# Patient Record
Sex: FEMALE | Race: WHITE | URBAN - NONMETROPOLITAN AREA
[De-identification: names, ages, dates, MRNs, and addresses within clinical notes are randomized per-mention and may not be internally consistent; named-entity substitution may affect disease eponyms.]

---

## 2018-07-03 ENCOUNTER — TRANSFERRED RECORDS (OUTPATIENT)
Dept: HEALTH INFORMATION MANAGEMENT | Facility: OTHER | Age: 63
End: 2018-07-03

## 2018-07-04 ENCOUNTER — RESULTS ONLY (OUTPATIENT)
Dept: LAB | Age: 63
End: 2018-07-04

## 2018-07-04 ENCOUNTER — MEDICAL CORRESPONDENCE (OUTPATIENT)
Dept: HEALTH INFORMATION MANAGEMENT | Facility: OTHER | Age: 63
End: 2018-07-04

## 2018-07-04 LAB
HGB BLD-MCNC: 8.8 G/DL (ref 11.7–15.7)
INR PPP: 2.33 (ref 0–1.3)
PROTHROMBIN TIME: 28 S (ref 11.9–15.2)

## 2018-07-05 ENCOUNTER — TRANSFERRED RECORDS (OUTPATIENT)
Dept: HEALTH INFORMATION MANAGEMENT | Facility: OTHER | Age: 63
End: 2018-07-05

## 2018-07-06 ENCOUNTER — MEDICAL CORRESPONDENCE (OUTPATIENT)
Dept: HEALTH INFORMATION MANAGEMENT | Facility: OTHER | Age: 63
End: 2018-07-06

## 2018-07-06 ENCOUNTER — RESULTS ONLY (OUTPATIENT)
Dept: LAB | Age: 63
End: 2018-07-06

## 2018-07-06 ENCOUNTER — TELEPHONE (OUTPATIENT)
Dept: ANTICOAGULATION | Facility: OTHER | Age: 63
End: 2018-07-06

## 2018-07-06 ENCOUNTER — TELEPHONE (OUTPATIENT)
Dept: FAMILY MEDICINE | Facility: OTHER | Age: 63
End: 2018-07-06

## 2018-07-06 ENCOUNTER — ANTICOAGULATION THERAPY VISIT (OUTPATIENT)
Dept: ANTICOAGULATION | Facility: OTHER | Age: 63
End: 2018-07-06
Payer: MEDICAID

## 2018-07-06 DIAGNOSIS — Z79.01 LONG-TERM (CURRENT) USE OF ANTICOAGULANTS: ICD-10-CM

## 2018-07-06 DIAGNOSIS — I81 DEEP VEIN THROMBOSIS OF PORTAL VEIN: Primary | ICD-10-CM

## 2018-07-06 DIAGNOSIS — I74.9 EMBOLISM AND THROMBOSIS (H): ICD-10-CM

## 2018-07-06 LAB
HGB BLD-MCNC: 10.3 G/DL (ref 11.7–15.7)
INR PPP: 2.19 (ref 0–1.3)
PROTHROMBIN TIME: 26.3 S (ref 11.9–15.2)

## 2018-07-06 PROCEDURE — 99207 ZZC NO CHARGE NURSE ONLY: CPT | Performed by: FAMILY MEDICINE

## 2018-07-06 RX ORDER — WARFARIN SODIUM 5 MG/1
TABLET ORAL
Qty: 30 TABLET | COMMUNITY
Start: 2018-07-06

## 2018-07-06 NOTE — TELEPHONE ENCOUNTER
[facility] called she can be seen when [facility] comes to do rounds on Aug 1.   Kylee Salvador LPN ..........7/6/2018 11:48 AM

## 2018-07-06 NOTE — TELEPHONE ENCOUNTER
Patient was admitted to [facility] for dx of rehab after right TKA. She is taking warfarin for dx of portal vein thrombosis and lupus erythematosus. protime will need orders to manage patient's INRs. Orders are attached please review and sign. Shirley Covington RN    7/6/2018  12:52 PM

## 2018-07-06 NOTE — PROGRESS NOTES
ANTICOAGULATION FOLLOW-UP CLINIC VISIT    Patient Name:  Sandrita Chow  Date:  7/6/2018  Contact Type:  fax from [facility] per Rachel BENTON    SUBJECTIVE:     Patient Findings     Positives Hospital admission (d/c from [facility] in [place] dx of right TKA. at [facility] for rehab)           OBJECTIVE    INR   Date Value Ref Range Status   07/06/2018 2.19 (H) 0 - 1.3 Final       ASSESSMENT / PLAN  INR assessment THER    Recheck INR In: 5 DAYS    INR Location [facility]      Anticoagulation Summary as of 7/6/2018     INR goal 2.0-3.0   Today's INR 2.19   Warfarin maintenance plan 7.5 mg (5 mg x 1.5) on Mon, Thu; 10 mg (5 mg x 2) all other days   Full warfarin instructions 7.5 mg on Mon, Thu; 10 mg all other days   Weekly warfarin total 65 mg   Plan last modified Shirley Covington RN (7/6/2018)   Next INR check 7/11/2018   Target end date Indefinite    Indications   Embolism and thrombosis (H) [I74.9] [I74.9]  Long-term (current) use of anticoagulants [Z79.01] [Z79.01]         Anticoagulation Episode Summary     INR check location     Preferred lab     Send INR reminders to  INR    Comments       Anticoagulation Care Providers     Provider Role Specialty Phone number    Hayley Abarca MD [facility] [phone]            See the Encounter Report to view Anticoagulation Flowsheet and Dosing Calendar (Go to Encounters tab in chart review, and find the Anticoagulation Therapy Visit)        Shirley Covington, RN

## 2018-07-06 NOTE — TELEPHONE ENCOUNTER
Cipriano from [place] called in regards to setting up an appointment to establish care and nursing home recert. She is wondering if Dr. Abarca can fit her in sometime within the next two weeks for this. Please call [facility] back in regards to this. Thank You!

## 2018-07-06 NOTE — MR AVS SNAPSHOT
Sandrita Chow   7/6/2018   Anticoagulation Therapy Visit    Description:  62 year old female   Provider:  Hayley Abarca MD   Department:   Anticoag           INR as of 7/6/2018     Today's INR 2.19      Anticoagulation Summary as of 7/6/2018     INR goal 2.0-3.0   Today's INR 2.19   Full warfarin instructions 7.5 mg on Mon, Thu; 10 mg all other days   Next INR check 7/11/2018    Indications   Embolism and thrombosis (H) [I74.9] [I74.9]  Long-term (current) use of anticoagulants [Z79.01] [Z79.01]         Description     Continue same Warfarin dose and recheck in 1 week  Shirley Covington RN   7/6/2018    2:30 PM        July 2018 Details    Sun Mon Tue Wed Thu Fri Sat     1               2               3               4               5               6      10 mg   See details      7      10 mg           8      10 mg         9      7.5 mg         10      10 mg         11            12               13               14                 15               16               17               18               19               20               21                 22               23               24               25               26               27               28                 29               30               31                    Date Details   07/06 This INR check       Date of next INR:  7/11/2018         How to take your warfarin dose     To take:  7.5 mg Take 1.5 of the 5 mg tablets.    To take:  10 mg Take 2 of the 5 mg tablets.

## 2018-07-07 ENCOUNTER — TELEPHONE (OUTPATIENT)
Dept: FAMILY MEDICINE | Facility: OTHER | Age: 63
End: 2018-07-07

## 2018-07-07 DIAGNOSIS — Z96.651 STATUS POST TOTAL RIGHT KNEE REPLACEMENT: Primary | ICD-10-CM

## 2018-07-07 RX ORDER — OXYCODONE HYDROCHLORIDE 5 MG/1
10-15 TABLET ORAL EVERY 4 HOURS PRN
Qty: 360 TABLET | Refills: 0 | Status: SHIPPED | OUTPATIENT
Start: 2018-07-07

## 2018-07-08 ENCOUNTER — HEALTH MAINTENANCE LETTER (OUTPATIENT)
Age: 63
End: 2018-07-08

## 2018-07-09 ENCOUNTER — NURSING HOME VISIT (OUTPATIENT)
Dept: GERIATRICS | Facility: OTHER | Age: 63
End: 2018-07-09
Payer: MEDICAID

## 2018-07-09 VITALS
SYSTOLIC BLOOD PRESSURE: 128 MMHG | RESPIRATION RATE: 18 BRPM | TEMPERATURE: 97.5 F | HEART RATE: 90 BPM | DIASTOLIC BLOOD PRESSURE: 75 MMHG

## 2018-07-09 DIAGNOSIS — I74.9 EMBOLISM AND THROMBOSIS (H): ICD-10-CM

## 2018-07-09 DIAGNOSIS — D64.9 POSTOPERATIVE ANEMIA: ICD-10-CM

## 2018-07-09 DIAGNOSIS — Z96.651 STATUS POST TOTAL RIGHT KNEE REPLACEMENT: Primary | ICD-10-CM

## 2018-07-09 DIAGNOSIS — F43.10 PTSD (POST-TRAUMATIC STRESS DISORDER): ICD-10-CM

## 2018-07-09 DIAGNOSIS — R00.0 TACHYCARDIA: ICD-10-CM

## 2018-07-09 DIAGNOSIS — F33.9 RECURRENT MAJOR DEPRESSIVE DISORDER, REMISSION STATUS UNSPECIFIED (H): ICD-10-CM

## 2018-07-09 DIAGNOSIS — R76.0 LUPUS ANTICOAGULANT POSITIVE: ICD-10-CM

## 2018-07-09 DIAGNOSIS — Z79.01 LONG-TERM (CURRENT) USE OF ANTICOAGULANTS: ICD-10-CM

## 2018-07-09 PROBLEM — F32.9 MDD (MAJOR DEPRESSIVE DISORDER): Status: ACTIVE | Noted: 2018-07-09

## 2018-07-09 PROCEDURE — 99310 SBSQ NF CARE HIGH MDM 45: CPT | Performed by: NURSE PRACTITIONER

## 2018-07-09 NOTE — PROGRESS NOTES
[facility]    Chief Complaint   Patient presents with     Nursing Home Acute       HPI:    Sandrita Chow is a 62 year old  (1955), who is being seen today for an episodic care visit at [facility].  HPI information obtained from: facility staff and resident. Today's concern is: Initial nurse practitioner evaluation for recent right TKA with hematoma and blistering, postoperative anemia, chronic anticoagulation secondary to positive lupus anticoagulant and current tobacco use, PTSD and major depression disorder with anxiety.    Patient was admitted to [facility] last week after she had right TKA.  She had complications which included a large hematoma.  She did have large blisters around the incision.  The initial dressing came off due to drainage from the incision.  Steri-Strips also fell off and these were replaced by nursing.  The wound closed once the swelling and drainage were controlled with Ace wrap.  The ecchymosis is improving and pain has significantly improved.  She is working with PT and OT.  She has a follow-up appointment with orthopedic surgeon next week.  She has been afebrile.  During hospital stay her hemoglobin did drop and Lovenox was discontinued.  She remains on warfarin which she was on prior to surgery for positive lupus anticoagulant.  She was seen by hematologist at one point who recommended that she be on warfarin lifelong as long as she was a smoker.  She does have history of splenic and portal vein thrombosis with splenectomy in the past.  She is also had increased anxiety since she has been admitted.  She feels that her pain is well controlled.  She has posttraumatic stress disorder major depressive disorder.  Nurse is been able to calm her with reassurance.  She does have an appointment with her psychiatric provider, Gilma Del Real NP in the near future.  She denies suicide ideation.  She lives with her mother and father.  They help her with  appointments.  Her significant other currently is in half-way.  By reviewing her chart her pulses average from  with tachycardic episodes have been related to anxiety and pain according to nursing.  She has been afebrile.  Her blood pressures been well controlled and has ranged from 105//84.  She did have hemoglobin and INR checked earlier today.  Hospital discharge hemoglobin was 8.5 g/dL.  She is not on any iron supplements and is not symptomatic of anemia.    Past medical history, past surgical history, social history, allergies and medication list available through [facility] records are reviewed.    REVIEW OF SYSTEMS:  Review of Systems  12 point complete review of systems discussed with nursing staff and resident, all positive findings discussed in HPI otherwise negative review of systems.    Physical Exam:  /75  Pulse 90  Temp 97.5  F (36.4  C)  Resp 18  Physical Exam  Pleasant female, no acute distress.  Alert and oriented ×4.  Answers questions appropriately.  Skin color pink.  Sclera nonicteric.  Mucous members moist.  Neck supple without adenopathy.  No thyromegaly.  Lung fields clear to auscultation.  Cardiovascular regular rate and rhythm, apical pulse 82, no S3 auscultated.  Abdomen soft without masses, tenderness and organomegaly.  Left lower extremity without edema.  Right lower extremity with 1-2+ edema.  There is fading ecchymosis that is now brown to purple in color around the knee and down by her ankle.  The right TKA incision is well approximated and without drainage.  Steri-Strips are in place.  No surrounding erythema or warmth.  No tenderness with palpation.  Limited range of motion causes no pain at this time.    All hospital records, laboratory and diagnostic studies available are reviewed and discussed with patient and nursing staff.  Recent Labs:   See HPI for further discussion  CBC RESULTS:   Recent Labs   Lab Test  07/06/18   0800  07/04/18   1500   HGB  10.3*   8.8*         No results found for: TSH]      Assessment    ICD-10-CM    1. Status post total right knee replacement Z96.651    2. Embolism and thrombosis (H) [I74.9] I74.9    3. Long-term (current) use of anticoagulants [Z79.01] Z79.01    4. Lupus anticoagulant positive R76.0    5. Postoperative anemia D64.9    6. Tachycardia R00.0    7. PTSD (post-traumatic stress disorder) F43.10    8. Recurrent major depressive disorder, remission status unspecified (H) F33.9        Plan:  1.  Keep follow-up appointment with orthopedic surgeon.  Continue to monitor wound for evidence of infection to include redness, warmth, drainage or any evidence of wound dehiscence.  If this occurs then have patient evaluated and report to an orthopedic surgeon.  Continue with Ace bandage for edema management.  2.  Continue with pro time clinic for chronic anticoagulation monitoring.  Monitor for signs and symptoms of bleeding.  3.  Postoperative anemia improving with hemoglobin improving from 8.5 g/dL to 10.3 g/dL.  She is not symptomatic of anemia.  Check hemoglobin again in 2 weeks.  4.  Continue to monitor for tachycardia.  If this persists then she will need to get an EKG however according to nursing evaluation this seems to coincide with anxiety mostly.  Sometimes when she was having more pain.  Pain seems well controlled at this time.  5.  She states she has an upcoming appointment with her psychiatric provider.  She needs to keep this appointment.        Electronically signed by  CHAYA Price   7/9/2018  3:50 PM

## 2018-07-09 NOTE — MR AVS SNAPSHOT
"              After Visit Summary   7/9/2018    Sandrita Chow    MRN: [id number]           Patient Information     Date Of Birth          1955        Visit Information        Provider Department      7/9/2018 3:47 PM Radhika Schrader NP [facility]        Today's Diagnoses     Status post total right knee replacement    -  1    Embolism and thrombosis (H) [I74.9]        Long-term (current) use of anticoagulants [Z79.01]        Lupus anticoagulant positive        Postoperative anemia        Tachycardia        PTSD (post-traumatic stress disorder)        Recurrent major depressive disorder, remission status unspecified (H)           Follow-ups after your visit        Who to contact     If you have questions or need follow up information about today's clinic visit or your schedule please contact [facility] directly at [phone].  Normal or non-critical lab and imaging results will be communicated to you by Bswifthart, letter or phone within 4 business days after the clinic has received the results. If you do not hear from us within 7 days, please contact the clinic through Bswifthart or phone. If you have a critical or abnormal lab result, we will notify you by phone as soon as possible.  Submit refill requests through MICMALI or call your pharmacy and they will forward the refill request to us. Please allow 3 business days for your refill to be completed.          Additional Information About Your Visit        Bswifthart Information     MICMALI lets you send messages to your doctor, view your test results, renew your prescriptions, schedule appointments and more. To sign up, go to [email or web address] . Click on \"Log in\" on the left side of the screen, which will take you to the Welcome page. Then click on \"Sign up Now\" on the right side of the page.     You will be asked to enter the access code listed below, as well as some personal information. Please " follow the directions to create your username and password.     Your access code is: [email or web address]  Expires: 10/7/2018  4:00 PM     Your access code will  in 90 days. If you need help or a new code, please call your [place] clinic or [phone].        Care EveryWhere ID     This is your Care EveryWhere ID. This could be used by other organizations to access your [place] medical records  [id number]        Your Vitals Were     Pulse Temperature Respirations             90 97.5  F (36.4  C) 18          Blood Pressure from Last 3 Encounters:   18 128/75    Weight from Last 3 Encounters:   No data found for Wt              Today, you had the following     No orders found for display       Primary Care Provider Office Phone # Fax #    Hayley Abarca [phone]       [address] [facility] [address]        Equal Access to Services     [facility]: Hadii aad ku hadasho Soomaali, waaxda luqadaha, qaybta kaalmada adeegyada, waxay idiin haymadien jae platt . So [facility] [phone].    ATENCIÓN: Si habla español, tiene a raya disposición servicios gratuitos de asistencia lingüística. Ralfame al [phone].    We comply with applicable federal civil rights laws and [place] laws. We do not discriminate on the basis of race, color, national origin, age, disability, sex, sexual orientation, or gender identity.            Thank you!     Thank you for choosing [facility] AND [facility]  for your care. Our goal is always to provide you with excellent care. Hearing back from our patients is one way we can continue to improve our services. Please take a few minutes to complete the written survey that you may receive in the mail after your visit with us. Thank you!             Your Updated Medication List - Protect others around you: Learn how to safely use, store and throw away your medicines at www.disposemymeds.org.          This list is accurate as of 18  4:00 PM.   Always use your most recent med list.                   Brand Name Dispense Instructions for use Diagnosis    oxyCODONE IR 5 MG tablet    ROXICODONE    360 tablet    Take 2-3 tablets (10-15 mg) by mouth every 4 hours as needed for pain Pain 1-6/10 take 2 pills and pain 7-10/10 take 3 pills    Status post total right knee replacement       warfarin 5 MG tablet    COUMADIN    30 tablet    Take 7.5 mg x 2 days and 10 mg x 5 days/week or [facility]

## 2018-07-11 ENCOUNTER — TRANSFERRED RECORDS (OUTPATIENT)
Dept: HEALTH INFORMATION MANAGEMENT | Facility: OTHER | Age: 63
End: 2018-07-11

## 2018-07-11 ENCOUNTER — ANTICOAGULATION THERAPY VISIT (OUTPATIENT)
Dept: ANTICOAGULATION | Facility: OTHER | Age: 63
End: 2018-07-11
Payer: MEDICAID

## 2018-07-11 ENCOUNTER — MEDICAL CORRESPONDENCE (OUTPATIENT)
Dept: HEALTH INFORMATION MANAGEMENT | Facility: OTHER | Age: 63
End: 2018-07-11

## 2018-07-11 ENCOUNTER — RESULTS ONLY (OUTPATIENT)
Dept: LAB | Age: 63
End: 2018-07-11

## 2018-07-11 DIAGNOSIS — Z79.01 LONG-TERM (CURRENT) USE OF ANTICOAGULANTS: ICD-10-CM

## 2018-07-11 DIAGNOSIS — I74.9 EMBOLISM AND THROMBOSIS (H): ICD-10-CM

## 2018-07-11 LAB
INR PPP: 1.72 (ref 0–1.3)
PROTHROMBIN TIME: 20.6 S (ref 11.9–15.2)

## 2018-07-11 PROCEDURE — 99207 ZZC NO CHARGE NURSE ONLY: CPT | Performed by: FAMILY MEDICINE

## 2018-07-11 NOTE — PROGRESS NOTES
ANTICOAGULATION FOLLOW-UP CLINIC VISIT    Patient Name:  Sandrita Chow  Date:  7/11/2018  Contact Type:  fax from [facility] per Rachel RN    SUBJECTIVE:     Patient Findings     Positives No Problem Findings           OBJECTIVE    INR   Date Value Ref Range Status   07/11/2018 1.72 (H) 0 - 1.3 Final       ASSESSMENT / PLAN  INR assessment SUB    Recheck INR In: 5 DAYS    INR Location [facility]      Anticoagulation Summary as of 7/11/2018     INR goal 2.0-3.0   Today's INR 1.72!   Warfarin maintenance plan 10 mg (5 mg x 2) every day   Full warfarin instructions 10 mg every day   Weekly warfarin total 70 mg   Plan last modified Shirley Covington, RN (7/11/2018)   Next INR check 7/16/2018   Target end date Indefinite    Indications   Embolism and thrombosis (H) [I74.9] [I74.9]  Long-term (current) use of anticoagulants [Z79.01] [Z79.01]         Anticoagulation Episode Summary     INR check location     Preferred lab     Send INR reminders to  INR    Comments       Anticoagulation Care Providers     Provider Role Specialty Phone number    Hayley Abarca MD [facility] [phone]            See the Encounter Report to view Anticoagulation Flowsheet and Dosing Calendar (Go to Encounters tab in chart review, and find the Anticoagulation Therapy Visit)        Shirley Covington, RN

## 2018-07-11 NOTE — MR AVS SNAPSHOT
Sandrita Chow   7/11/2018   Anticoagulation Therapy Visit    Description:  62 year old female   Provider:  Hayley Abarca MD   Department:   Anticoag           INR as of 7/11/2018     Today's INR 1.72!      Anticoagulation Summary as of 7/11/2018     INR goal 2.0-3.0   Today's INR 1.72!   Full warfarin instructions 10 mg every day   Next INR check 7/16/2018    Indications   Embolism and thrombosis (H) [I74.9] [I74.9]  Long-term (current) use of anticoagulants [Z79.01] [Z79.01]         Description     Take 10 mg x 5 days and recheck on 7/16/18. Shirley Covington RN    7/11/2018  10:57 AM        July 2018 Details    Sun Mon Tue Wed Thu Fri Sat     1               2               3               4               5               6               7                 8               9               10               11      10 mg   See details      12      10 mg         13      10 mg         14      10 mg           15      10 mg         16            17               18               19               20               21                 22               23               24               25               26               27               28                 29               30               31                    Date Details   07/11 This INR check       Date of next INR:  7/16/2018         How to take your warfarin dose     To take:  10 mg Take 2 of the 5 mg tablets.

## 2018-07-16 ENCOUNTER — MEDICAL CORRESPONDENCE (OUTPATIENT)
Dept: HEALTH INFORMATION MANAGEMENT | Facility: OTHER | Age: 63
End: 2018-07-16

## 2018-07-16 ENCOUNTER — RESULTS ONLY (OUTPATIENT)
Dept: LAB | Age: 63
End: 2018-07-16

## 2018-07-16 ENCOUNTER — ANTICOAGULATION THERAPY VISIT (OUTPATIENT)
Dept: FAMILY MEDICINE | Facility: OTHER | Age: 63
End: 2018-07-16
Payer: MEDICAID

## 2018-07-16 ENCOUNTER — TRANSFERRED RECORDS (OUTPATIENT)
Dept: HEALTH INFORMATION MANAGEMENT | Facility: OTHER | Age: 63
End: 2018-07-16

## 2018-07-16 DIAGNOSIS — I74.9 EMBOLISM AND THROMBOSIS (H): ICD-10-CM

## 2018-07-16 DIAGNOSIS — Z79.01 LONG-TERM (CURRENT) USE OF ANTICOAGULANTS: ICD-10-CM

## 2018-07-16 LAB
INR PPP: 1.76 (ref 0–1.3)
PROTHROMBIN TIME: 21.1 S (ref 11.9–15.2)

## 2018-07-16 PROCEDURE — 99207 ZZC NO CHARGE NURSE ONLY: CPT | Performed by: FAMILY MEDICINE

## 2018-07-16 NOTE — PROGRESS NOTES
ANTICOAGULATION FOLLOW-UP CLINIC VISIT    Patient Name:  Sandrita Chow  Date:  7/16/2018  Contact Type:  Face to Face    SUBJECTIVE:     Patient Findings     Positives Initiation of therapy, No Problem Findings    Comments Per fax from Rachel Li RN, [place] Ryan.               OBJECTIVE    INR   Date Value Ref Range Status   07/16/2018 1.76 (H) 0 - 1.3 Final       ASSESSMENT / PLAN  INR assessment SUB    Recheck INR In: 3 DAYS    INR Location [facility]      Anticoagulation Summary as of 7/16/2018     INR goal 2.0-3.0   Today's INR 1.76!   Warfarin maintenance plan 10 mg (5 mg x 2) every day   Full warfarin instructions 7/16: 12.5 mg; 7/17: 12.5 mg; Otherwise 10 mg every day   Weekly warfarin total 70 mg   Plan last modified Shirley Covington RN (7/11/2018)   Next INR check 7/19/2018   Priority INR   Target end date Indefinite    Indications   Embolism and thrombosis (H) [I74.9] [I74.9]  Long-term (current) use of anticoagulants [Z79.01] [Z79.01]         Anticoagulation Episode Summary     INR check location     Preferred lab     Send INR reminders to  INR    Comments       Anticoagulation Care Providers     Provider Role Specialty Phone number    Hayley Abarca MD [facility] [phone]            See the Encounter Report to view Anticoagulation Flowsheet and Dosing Calendar (Go to Encounters tab in chart review, and find the Anticoagulation Therapy Visit)     No encounter, INR and assessment received via fax.  Instructions faxed back to RN.      Mary Pelaez RN

## 2018-07-16 NOTE — MR AVS SNAPSHOT
Sandrita Chow   7/16/2018   Anticoagulation Therapy Visit    Description:  62 year old female   Provider:  Hayley Abarca MD   Department:   Family Practice           INR as of 7/16/2018     Today's INR 1.76!      Anticoagulation Summary as of 7/16/2018     INR goal 2.0-3.0   Today's INR 1.76!   Full warfarin instructions 7/16: 12.5 mg; 7/17: 12.5 mg; Otherwise 10 mg every day   Next INR check 7/19/2018    Indications   Embolism and thrombosis (H) [I74.9] [I74.9]  Long-term (current) use of anticoagulants [Z79.01] [Z79.01]         Description     Take 12.5 mg x two days, 10 mg x one day, recheck INR 07/19/18.  Mary Pelaez ....................  7/16/2018   3:48 PM        July 2018 Details    Sun Mon Tue Wed Thu Fri Sat     1               2               3               4               5               6               7                 8               9               10               11               12               13               14                 15               16      12.5 mg   See details      17      12.5 mg         18      10 mg         19            20               21                 22               23               24               25               26               27               28                 29               30               31                    Date Details   07/16 This INR check       Date of next INR:  7/19/2018         How to take your warfarin dose     To take:  10 mg Take 2 of the 5 mg tablets.    To take:  12.5 mg Take 2.5 of the 5 mg tablets.

## 2018-07-19 ENCOUNTER — RESULTS ONLY (OUTPATIENT)
Dept: LAB | Age: 63
End: 2018-07-19

## 2018-07-19 ENCOUNTER — ANTICOAGULATION THERAPY VISIT (OUTPATIENT)
Dept: FAMILY MEDICINE | Facility: OTHER | Age: 63
End: 2018-07-19
Payer: MEDICAID

## 2018-07-19 ENCOUNTER — MEDICAL CORRESPONDENCE (OUTPATIENT)
Dept: HEALTH INFORMATION MANAGEMENT | Facility: OTHER | Age: 63
End: 2018-07-19

## 2018-07-19 ENCOUNTER — TRANSFERRED RECORDS (OUTPATIENT)
Dept: HEALTH INFORMATION MANAGEMENT | Facility: OTHER | Age: 63
End: 2018-07-19

## 2018-07-19 ENCOUNTER — APPOINTMENT (OUTPATIENT)
Dept: LAB | Facility: OTHER | Age: 63
End: 2018-07-19
Attending: FAMILY MEDICINE
Payer: MEDICAID

## 2018-07-19 DIAGNOSIS — Z79.01 LONG-TERM (CURRENT) USE OF ANTICOAGULANTS: ICD-10-CM

## 2018-07-19 DIAGNOSIS — I74.9 EMBOLISM AND THROMBOSIS (H): ICD-10-CM

## 2018-07-19 LAB
HGB BLD-MCNC: 12.4 G/DL (ref 11.7–15.7)
INR PPP: 2.01 (ref 0–1.3)
PROTHROMBIN TIME: 24.1 S (ref 11.9–15.2)

## 2018-07-19 PROCEDURE — 99207 ZZC NO CHARGE NURSE ONLY: CPT | Performed by: FAMILY MEDICINE

## 2018-07-19 NOTE — MR AVS SNAPSHOT
Sandrita Chow   7/19/2018   Anticoagulation Therapy Visit    Description:  62 year old female   Provider:  Hayley Abarca MD   Department:   Family Practice           INR as of 7/19/2018     Today's INR 2.01      Anticoagulation Summary as of 7/19/2018     INR goal 2.0-3.0   Today's INR 2.01   Full warfarin instructions 7/19: 12.5 mg; Otherwise 10 mg every day   Next INR check 7/23/2018    Indications   Embolism and thrombosis (H) [I74.9] [I74.9]  Long-term (current) use of anticoagulants [Z79.01] [Z79.01]         Description     Take 12.5 mg today, then 10 mg daily x three days, recheck INR 07/23/18.  Mary Pelaez ....................  7/19/2018   11:45 AM        July 2018 Details    Sun Mon Tue Wed Thu Fri Sat     1               2               3               4               5               6               7                 8               9               10               11               12               13               14                 15               16               17               18               19      12.5 mg   See details      20      10 mg         21      10 mg           22      10 mg         23            24               25               26               27               28                 29               30               31                    Date Details   07/19 This INR check       Date of next INR:  7/23/2018         How to take your warfarin dose     To take:  10 mg Take 2 of the 5 mg tablets.    To take:  12.5 mg Take 2.5 of the 5 mg tablets.

## 2018-07-19 NOTE — PROGRESS NOTES
ANTICOAGULATION FOLLOW-UP CLINIC VISIT    Patient Name:  Sandrita Chow  Date:  7/19/2018  Contact Type:  Face to Face    SUBJECTIVE:     Patient Findings     Positives Initiation of therapy, No Problem Findings    Comments Per fax from Rachel Li RN, Mireya Davis.             OBJECTIVE    INR   Date Value Ref Range Status   07/19/2018 2.01 (H) 0 - 1.3 Final       ASSESSMENT / PLAN  INR assessment THER    Recheck INR In: 4 DAYS    INR Location [facility]      Anticoagulation Summary as of 7/19/2018     INR goal 2.0-3.0   Today's INR 2.01   Warfarin maintenance plan 10 mg (5 mg x 2) every day   Full warfarin instructions 7/19: 12.5 mg; Otherwise 10 mg every day   Weekly warfarin total 70 mg   Plan last modified Shirley Covington RN (7/11/2018)   Next INR check 7/23/2018   Priority INR   Target end date Indefinite    Indications   Embolism and thrombosis (H) [I74.9] [I74.9]  Long-term (current) use of anticoagulants [Z79.01] [Z79.01]         Anticoagulation Episode Summary     INR check location     Preferred lab     Send INR reminders to  INR    Comments       Anticoagulation Care Providers     Provider Role Specialty Phone number    Hayley Abarca MD [facility] [phone]            See the Encounter Report to view Anticoagulation Flowsheet and Dosing Calendar (Go to Encounters tab in chart review, and find the Anticoagulation Therapy Visit)        Mary Pelaez RN                 ANTICOAGULATION FOLLOW-UP CLINIC VISIT    Patient Name:  Sandrita Chow  Date:  7/19/2018  Contact Type:  Face to Face    SUBJECTIVE:     Patient Findings     Positives Initiation of therapy, No Problem Findings    Comments Per fax from Rachel Li RN, Mireya Davis.             OBJECTIVE    INR   Date Value Ref Range Status   07/19/2018 2.01 (H) 0 - 1.3 Final       ASSESSMENT / PLAN  INR assessment THER    Recheck INR In: 4 DAYS    INR Location [facility]      Anticoagulation Summary as of 7/19/2018      INR goal 2.0-3.0   Today's INR 2.01   Warfarin maintenance plan 10 mg (5 mg x 2) every day   Full warfarin instructions 7/19: 12.5 mg; Otherwise 10 mg every day   Weekly warfarin total 70 mg   Plan last modified Shirley Covington RN (7/11/2018)   Next INR check 7/23/2018   Priority INR   Target end date Indefinite    Indications   Embolism and thrombosis (H) [I74.9] [I74.9]  Long-term (current) use of anticoagulants [Z79.01] [Z79.01]         Anticoagulation Episode Summary     INR check location     Preferred lab     Send INR reminders to  INR    Comments       Anticoagulation Care Providers     Provider Role Specialty Phone number    Hayley Abarca MD [facility] [phone]            See the Encounter Report to view Anticoagulation Flowsheet and Dosing Calendar (Go to Encounters tab in chart review, and find the Anticoagulation Therapy Visit)     No encounter, INR and assessment received via fax.  Instructions faxed back to RN.      Mary Pelaez RN

## 2018-07-26 ENCOUNTER — ANTICOAGULATION THERAPY VISIT (OUTPATIENT)
Dept: ANTICOAGULATION | Facility: OTHER | Age: 63
End: 2018-07-26

## 2018-07-26 DIAGNOSIS — Z79.01 LONG-TERM (CURRENT) USE OF ANTICOAGULANTS: ICD-10-CM

## 2018-07-26 DIAGNOSIS — I74.9 EMBOLISM AND THROMBOSIS (H): ICD-10-CM

## 2019-06-17 PROBLEM — Z79.01 LONG TERM CURRENT USE OF ANTICOAGULANT THERAPY: Status: ACTIVE | Noted: 2018-07-06
